# Patient Record
Sex: MALE | HISPANIC OR LATINO | Employment: STUDENT
[De-identification: names, ages, dates, MRNs, and addresses within clinical notes are randomized per-mention and may not be internally consistent; named-entity substitution may affect disease eponyms.]

---

## 2021-05-02 ENCOUNTER — HEALTH MAINTENANCE LETTER (OUTPATIENT)
Age: 41
End: 2021-05-02

## 2021-10-17 ENCOUNTER — HEALTH MAINTENANCE LETTER (OUTPATIENT)
Age: 41
End: 2021-10-17

## 2022-01-18 ENCOUNTER — TRANSFERRED RECORDS (OUTPATIENT)
Dept: HEALTH INFORMATION MANAGEMENT | Facility: CLINIC | Age: 42
End: 2022-01-18
Payer: COMMERCIAL

## 2022-02-15 ENCOUNTER — TRANSFERRED RECORDS (OUTPATIENT)
Dept: HEALTH INFORMATION MANAGEMENT | Facility: CLINIC | Age: 42
End: 2022-02-15
Payer: COMMERCIAL

## 2022-04-28 NOTE — TELEPHONE ENCOUNTER
RECORDS RECEIVED FROM:   DATE RECEIVED: 4.29.22   NOTES STATUS DETAILS   OFFICE NOTE from referring provider        OFFICE NOTE from other cardiologist        DISCHARGE SUMMARY from hospital        DISCHARGE REPORT from the ER       OPERATIVE REPORT        MEDICATION LIST       LABS     BMP       CBC       CMP       Lipids       TSH       DIAGNOSTIC PROCEDURES     EKG       Monitor Reports       IMAGING (DISC & REPORT)      Echo       Stress Tests       Cath       MRI/MRA       CT/CTA         *no related medica records

## 2022-04-29 ENCOUNTER — OFFICE VISIT (OUTPATIENT)
Dept: CARDIOLOGY | Facility: CLINIC | Age: 42
End: 2022-04-29
Attending: INTERNAL MEDICINE
Payer: COMMERCIAL

## 2022-04-29 ENCOUNTER — PRE VISIT (OUTPATIENT)
Dept: CARDIOLOGY | Facility: CLINIC | Age: 42
End: 2022-04-29
Payer: COMMERCIAL

## 2022-04-29 VITALS
HEIGHT: 66 IN | HEART RATE: 74 BPM | OXYGEN SATURATION: 99 % | WEIGHT: 172.2 LBS | DIASTOLIC BLOOD PRESSURE: 70 MMHG | BODY MASS INDEX: 27.68 KG/M2 | SYSTOLIC BLOOD PRESSURE: 116 MMHG

## 2022-04-29 DIAGNOSIS — R00.2 PALPITATIONS: Primary | ICD-10-CM

## 2022-04-29 PROCEDURE — 93005 ELECTROCARDIOGRAM TRACING: CPT

## 2022-04-29 PROCEDURE — G0463 HOSPITAL OUTPT CLINIC VISIT: HCPCS | Mod: 25

## 2022-04-29 PROCEDURE — 99203 OFFICE O/P NEW LOW 30 MIN: CPT | Performed by: INTERNAL MEDICINE

## 2022-04-29 RX ORDER — MULTIVIT WITH MINERALS/LUTEIN
1 TABLET ORAL DAILY
COMMUNITY

## 2022-04-29 ASSESSMENT — PAIN SCALES - GENERAL: PAINLEVEL: NO PAIN (0)

## 2022-04-29 NOTE — LETTER
Date:May 6, 2022      Patient was self referred, no letter generated. Do not send.        [facility] Health Information

## 2022-04-29 NOTE — PROGRESS NOTES
CARDIOLOGY CLINIC INITIAL CONSULTATION    HPI:  Anmol Ibarra is a 41 year old male who self referred to Cardiology for evaluation of chest discomfort.  Interview was conducted with assistance of a video .    Anmol is a pleasant man who presents with chest discomfort which he describes as pulsating over the left side of his chest and left arm.  It occurs with his heartbeat and lasts for a couple of seconds at a time.  No associated lightheadedness, shortness of breath and no history of syncope.  The symptoms started about 4 days ago and are described as mild.  Anmol is a  at [employer] and has final exams next week.  Over the past week he has been sleeping less and drinking much more coffee.  Usually has 2 coffees per day and now is up to 4 coffees and 2 espressos per day.  Symptoms are worse when he is lying down at night but can happen randomly throughout the day.  No association with exertion.  He goes to the gym to lift weights and is able to go up and down 2 flights of stairs without any dyspnea, chest pain, palpitations.      Of note Anmol is followed by cardiologist in his home country of [facility] due to history of PVCs and dilated left atrium.  He has controlled his PVCs through lifestyle modifications.  His last echocardiogram was done in January and the report states normal LV and RV function with mildly dilated left atrium and no significant valve disease.  His kidney function, electrolytes, thyroid function, hemoglobin were also checked and were within normal limits.    Both parents are living and there is no family history of heart disease, no arrhythmias.    The ASCVD Risk score (Chel CHIP Jr., et al., 2013) failed to calculate for the following reasons:    Cannot find a previous HDL lab    Cannot find a previous total cholesterol lab     ASSESSMENT AND PLAN  Anmol Ibarra is a 41 year old male who presents with symptoms of palpitations.  He has a history of PVCs which is  "followed by a cardiologist in [place].  He is likely experiencing increased PVCs due to lack of sleep and increased caffeine intake as he prepares for final exams.  I recommended that he cut back on his caffeine and increase his sleep as soon as he is able.  If his symptoms do not improve, return to clinic for further evaluation at that time.    Thank you for the opportunity to participate in the care of Mr. Anmol Ibarra. Please feel free to contact me with any additional questions or concerns.    Matt Anderson MD    Preventive Cardiology  Pager: [phone]    PAST MEDICAL HISTORY:  There is no problem list on file for this patient.    No past medical history on file.    CURRENT MEDICATIONS:  Current Outpatient Medications   Medication Sig Dispense Refill     multivitamin (CENTRUM SILVER) tablet Take 1 tablet by mouth daily         PAST SURGICAL HISTORY:  No past surgical history on file.    ALLERGIES  Patient has no known allergies.    FAMILY HX:  No family history on file.    SOCIAL HX:  Social History     Tobacco Use     Smoking status: Never Smoker     Smokeless tobacco: Never Used        ROS:  Comprehensive ROS is negative except as noted in HPI.    VITAL SIGNS:  /70 (BP Location: Right arm, Patient Position: Sitting, Cuff Size: Adult Regular)   Pulse 74   Ht 1.671 m (5' 5.79\")   Wt 78.1 kg (172 lb 3.2 oz)   SpO2 99%   BMI 27.97 kg/m    Body mass index is 27.97 kg/m .  Wt Readings from Last 2 Encounters:   04/29/22 78.1 kg (172 lb 3.2 oz)       PHYSICAL EXAM  Gen: pleasant man sitting comfortably in NAD  Head: nc/at  Eyes: EOMI  CV: nml s1/s2, no murmur or gallop; no JVD  Chest: clear lungs  Ext: warm, no LE edema  Skin: no rash on limited exam  Neuro: awake, alert, oriented, nml speech and gait  Vasc: 2+ bilateral carotid, brachial, radial, DP and PT pulses; no bruits noted    LABS: personally reviewed  CMPNo lab results found.  CBCNo lab results found.  INRNo lab results " found.  No lab results found.  No lab results found.    Most recent EKG: reviewed and discussed with the patient  4/29/2022 normal sinus rhythm, normal intervals, normal EKG    Most recent ECHO: reviewed report from [facility]  Normal LV and RV function.    No severe valve abnormalities.  Enlarged left atrium

## 2022-04-29 NOTE — LETTER
4/29/2022      RE: Anmol Ibarra  [address]   [address]  [facility] [address]       Dear Colleague,    Thank you for the opportunity to participate in the care of your patient, Anmol Ibarra, at the [facility] [place] [facility]. Please see a copy of my visit note below.    CARDIOLOGY CLINIC INITIAL CONSULTATION    HPI:  Anmol Ibarra is a 41 year old male who self referred to Cardiology for evaluation of chest discomfort.  Interview was conducted with assistance of a video .    Anmol is a pleasant man who presents with chest discomfort which he describes as pulsating over the left side of his chest and left arm.  It occurs with his heartbeat and lasts for a couple of seconds at a time.  No associated lightheadedness, shortness of breath and no history of syncope.  The symptoms started about 4 days ago and are described as mild.  Anmol is a  at [employer] and has final exams next week.  Over the past week he has been sleeping less and drinking much more coffee.  Usually has 2 coffees per day and now is up to 4 coffees and 2 espressos per day.  Symptoms are worse when he is lying down at night but can happen randomly throughout the day.  No association with exertion.  He goes to the gym to lift weights and is able to go up and down 2 flights of stairs without any dyspnea, chest pain, palpitations.      Of note Anmol is followed by cardiologist in his home country of [facility] due to history of PVCs and dilated left atrium.  He has controlled his PVCs through lifestyle modifications.  His last echocardiogram was done in January and the report states normal LV and RV function with mildly dilated left atrium and no significant valve disease.  His kidney function, electrolytes, thyroid function, hemoglobin were also checked and were within normal limits.    Both parents are living and there is no family history of  "heart disease, no arrhythmias.    The ASCVD Risk score (Chel CHIP Jr., et al., 2013) failed to calculate for the following reasons:    Cannot find a previous HDL lab    Cannot find a previous total cholesterol lab     ASSESSMENT AND PLAN  Anmol Ibarra is a 41 year old male who presents with symptoms of palpitations.  He has a history of PVCs which is followed by a cardiologist in [place].  He is likely experiencing increased PVCs due to lack of sleep and increased caffeine intake as he prepares for final exams.  I recommended that he cut back on his caffeine and increase his sleep as soon as he is able.  If his symptoms do not improve, return to clinic for further evaluation at that time.    Thank you for the opportunity to participate in the care of Mr. Anmol Ibarra. Please feel free to contact me with any additional questions or concerns.    Matt Anderson MD    Preventive Cardiology  Pager: [phone]    PAST MEDICAL HISTORY:  There is no problem list on file for this patient.    No past medical history on file.    CURRENT MEDICATIONS:  Current Outpatient Medications   Medication Sig Dispense Refill     multivitamin (CENTRUM SILVER) tablet Take 1 tablet by mouth daily         PAST SURGICAL HISTORY:  No past surgical history on file.    ALLERGIES  Patient has no known allergies.    FAMILY HX:  No family history on file.    SOCIAL HX:  Social History     Tobacco Use     Smoking status: Never Smoker     Smokeless tobacco: Never Used        ROS:  Comprehensive ROS is negative except as noted in HPI.    VITAL SIGNS:  /70 (BP Location: Right arm, Patient Position: Sitting, Cuff Size: Adult Regular)   Pulse 74   Ht 1.671 m (5' 5.79\")   Wt 78.1 kg (172 lb 3.2 oz)   SpO2 99%   BMI 27.97 kg/m    Body mass index is 27.97 kg/m .  Wt Readings from Last 2 Encounters:   04/29/22 78.1 kg (172 lb 3.2 oz)       PHYSICAL EXAM  Gen: pleasant man sitting comfortably in NAD  Head: nc/at  Eyes: " EOMI  CV: nml s1/s2, no murmur or gallop; no JVD  Chest: clear lungs  Ext: warm, no LE edema  Skin: no rash on limited exam  Neuro: awake, alert, oriented, nml speech and gait  Vasc: 2+ bilateral carotid, brachial, radial, DP and PT pulses; no bruits noted    LABS: personally reviewed  CMPNo lab results found.  CBCNo lab results found.  INRNo lab results found.  No lab results found.  No lab results found.    Most recent EKG: reviewed and discussed with the patient  4/29/2022 normal sinus rhythm, normal intervals, normal EKG    Most recent ECHO: reviewed report from [facility]  Normal LV and RV function.    No severe valve abnormalities.  Enlarged left atrium      Please do not hesitate to contact me if you have any questions/concerns.     Sincerely,     Matt Anderson MD

## 2022-04-29 NOTE — PATIENT INSTRUCTIONS
April 29, 2022    Cardiology Provider You Saw During Your Visit: Dr. Fern Anderson      Medication Changes: None      Follow Up: As needed        Follow the American Heart Association Diet and Lifestyle Recommendations:  -Limit saturated fat, trans fat, sodium, red meat, sweets and sugar-sweetened beverages. If you choose to eat red meat, compare labels and select the leanest cuts available.  -Aim for at least 150 minutes of moderate physical activity or 75 minutes of vigorous physical activity - or an equal combination of both - each week.      To Reach Us:  -During business hours: [phone], press option # 1 to schedule an appointment or to leave a message for your care team.     -After hours, weekends or holidays: [phone], press option #4 and ask to speak to the on-call cardiologist. Inform them you are a patient at the [facility].      Qi Osei RN  Cardiology Care Coordinator - General Cardiology  [facility]

## 2022-05-02 LAB
ATRIAL RATE - MUSE: 68 BPM
DIASTOLIC BLOOD PRESSURE - MUSE: NORMAL MMHG
INTERPRETATION ECG - MUSE: NORMAL
P AXIS - MUSE: 48 DEGREES
PR INTERVAL - MUSE: 140 MS
QRS DURATION - MUSE: 108 MS
QT - MUSE: 386 MS
QTC - MUSE: 410 MS
R AXIS - MUSE: 3 DEGREES
SYSTOLIC BLOOD PRESSURE - MUSE: NORMAL MMHG
T AXIS - MUSE: 20 DEGREES
VENTRICULAR RATE- MUSE: 68 BPM

## 2022-05-29 ENCOUNTER — HEALTH MAINTENANCE LETTER (OUTPATIENT)
Age: 42
End: 2022-05-29

## 2022-10-02 ENCOUNTER — HEALTH MAINTENANCE LETTER (OUTPATIENT)
Age: 42
End: 2022-10-02

## 2023-06-04 ENCOUNTER — HEALTH MAINTENANCE LETTER (OUTPATIENT)
Age: 43
End: 2023-06-04

## 2024-07-27 ENCOUNTER — HEALTH MAINTENANCE LETTER (OUTPATIENT)
Age: 44
End: 2024-07-27